# Patient Record
Sex: MALE | Race: BLACK OR AFRICAN AMERICAN | URBAN - METROPOLITAN AREA
[De-identification: names, ages, dates, MRNs, and addresses within clinical notes are randomized per-mention and may not be internally consistent; named-entity substitution may affect disease eponyms.]

---

## 2017-10-13 ENCOUNTER — OFFICE VISIT (OUTPATIENT)
Dept: FAMILY MEDICINE | Facility: CLINIC | Age: 42
End: 2017-10-13
Payer: COMMERCIAL

## 2017-10-13 VITALS
TEMPERATURE: 98.1 F | DIASTOLIC BLOOD PRESSURE: 82 MMHG | BODY MASS INDEX: 26.64 KG/M2 | SYSTOLIC BLOOD PRESSURE: 131 MMHG | WEIGHT: 183 LBS | OXYGEN SATURATION: 99 % | HEART RATE: 59 BPM

## 2017-10-13 DIAGNOSIS — Z00.01 ENCOUNTER FOR ROUTINE ADULT HEALTH EXAMINATION WITH ABNORMAL FINDINGS: Primary | ICD-10-CM

## 2017-10-13 DIAGNOSIS — Z23 NEED FOR PROPHYLACTIC VACCINATION AND INOCULATION AGAINST INFLUENZA: ICD-10-CM

## 2017-10-13 DIAGNOSIS — Z13.6 CARDIOVASCULAR SCREENING; LDL GOAL LESS THAN 160: ICD-10-CM

## 2017-10-13 LAB
CHOLEST SERPL-MCNC: 242 MG/DL
GLUCOSE BLD-MCNC: 105 MG/DL (ref 70–99)
HDLC SERPL-MCNC: 48 MG/DL
LDLC SERPL CALC-MCNC: 177 MG/DL
NONHDLC SERPL-MCNC: 194 MG/DL
TRIGL SERPL-MCNC: 87 MG/DL

## 2017-10-13 PROCEDURE — 82947 ASSAY GLUCOSE BLOOD QUANT: CPT | Performed by: PHYSICIAN ASSISTANT

## 2017-10-13 PROCEDURE — 36415 COLL VENOUS BLD VENIPUNCTURE: CPT | Performed by: PHYSICIAN ASSISTANT

## 2017-10-13 PROCEDURE — 90686 IIV4 VACC NO PRSV 0.5 ML IM: CPT | Performed by: PHYSICIAN ASSISTANT

## 2017-10-13 PROCEDURE — 90471 IMMUNIZATION ADMIN: CPT | Performed by: PHYSICIAN ASSISTANT

## 2017-10-13 PROCEDURE — 80061 LIPID PANEL: CPT | Performed by: PHYSICIAN ASSISTANT

## 2017-10-13 PROCEDURE — 99396 PREV VISIT EST AGE 40-64: CPT | Mod: 25 | Performed by: PHYSICIAN ASSISTANT

## 2017-10-13 ASSESSMENT — PAIN SCALES - GENERAL: PAINLEVEL: NO PAIN (0)

## 2017-10-13 NOTE — MR AVS SNAPSHOT
After Visit Summary   10/13/2017    Riki Thayer    MRN: [id number]           Patient Information     Date Of Birth          1975        Visit Information        Provider Department      10/13/2017 8:20 AM Khurram Sunshine PA-C [facility]        Today's Diagnoses     Encounter for routine adult health examination with abnormal findings    -  1    CARDIOVASCULAR SCREENING; LDL GOAL LESS THAN 160        Need for prophylactic vaccination and inoculation against influenza          Care Instructions      Preventive Health Recommendations  Male Ages 40 to 49    Yearly exam:             See your health care provider every year in order to  o   Review health changes.   o   Discuss preventive care.    o   Review your medicines if your doctor has prescribed any.    You should be tested each year for STDs (sexually transmitted diseases) if you re at risk.     Have a cholesterol test every 5 years.     Have a colonoscopy (test for colon cancer) if someone in your family has had colon cancer or polyps before age 50.     After age 45, have a diabetes test (fasting glucose). If you are at risk for diabetes, you should have this test every 3 years.      Talk with your health care provider about whether or not a prostate cancer screening test (PSA) is right for you.    Shots: Get a flu shot each year. Get a tetanus shot every 10 years.     Nutrition:    Eat at least 5 servings of fruits and vegetables daily.     Eat whole-grain bread, whole-wheat pasta and brown rice instead of white grains and rice.     Talk to your provider about Calcium and Vitamin D.     Lifestyle    Exercise for at least 150 minutes a week (30 minutes a day, 5 days a week). This will help you control your weight and prevent disease.     Limit alcohol to one drink per day.     No smoking.     Wear sunscreen to prevent skin cancer.     See your dentist every six months for an exam and cleaning.               Follow-ups after your visit        Who to contact     Normal or non-critical lab and imaging results will be communicated to you by [employer], letter or phone within 4 business days after the clinic has received the results. If you do not hear from us within 7 days, please contact the clinic through [employer] or phone. If you have a critical or abnormal lab result, we will notify you by phone as soon as possible.  Submit refill requests through [employer] or call your pharmacy and they will forward the refill request to us. Please allow 3 business days for your refill to be completed.          If you need to speak with a  for additional information , please call: [phone]             Additional Information About Your Visit        [employer] Information     [employer] gives you secure access to your electronic health record. If you see a primary care provider, you can also send messages to your care team and make appointments. If you have questions, please call your primary care clinic.  If you do not have a primary care provider, please call [phone] and they will assist you.        Care EveryWhere ID     This is your Care EveryWhere ID. This could be used by other organizations to access your [facility] medical records  [id number]        Your Vitals Were     Pulse Temperature Pulse Oximetry BMI (Body Mass Index)          59 98.1  F (36.7  C) (Oral) 99% 26.64 kg/m2         Blood Pressure from Last 3 Encounters:   10/13/17 131/82   10/10/16 115/75   09/24/15 122/72    Weight from Last 3 Encounters:   10/13/17 183 lb (83 kg)   10/10/16 183 lb (83 kg)   09/24/15 179 lb 8 oz (81.4 kg)              We Performed the Following     FLU VAC, SPLIT VIRUS IM > 3 YO (QUADRIVALENT) [83490]     Glucose whole blood     Lipid panel reflex to direct LDL     Vaccine Administration, Initial [51618]        Primary Care Provider Office Phone # Fax #    Khurram Sunshine PA-C [phone]       [address]  [place] [address]        Equal Access to Services     [facility] : Hadii marquez cano paulana Harper, wamalloryda luqadaha, qaybta kaalmatenisha johnsonvivianamartha sotelo. So [facility] [phone].    ATENCIÓN: Si habla español, tiene a hunter disposición servicios gratuitos de asistencia lingüística. Llame al [phone].    We comply with applicable federal civil rights laws and [place] laws. We do not discriminate on the basis of race, color, national origin, age, disability, sex, sexual orientation, or gender identity.            Thank you!     Thank you for choosing [facility]  for your care. Our goal is always to provide you with excellent care. Hearing back from our patients is one way we can continue to improve our services. Please take a few minutes to complete the written survey that you may receive in the mail after your visit with us. Thank you!             Your Updated Medication List - Protect others around you: Learn how to safely use, store and throw away your medicines at www.disposemymeds.org.          This list is accurate as of: 10/13/17  9:13 AM.  Always use your most recent med list.                   Brand Name Dispense Instructions for use Diagnosis    clotrimazole-betamethasone cream    LOTRISONE    45 g    Apply topically 2 times daily    Tinea cruris

## 2017-10-13 NOTE — PROGRESS NOTES
"  SUBJECTIVE:   CC: Riki Thayer is an 42 year old male who presents for preventative health visit.     Healthy Habits:    Do you get at least three servings of calcium containing foods daily (dairy, green leafy vegetables, etc.)? {YES/NO, DAIRY INTAKE:727550::\"yes\"}    Amount of exercise or daily activities, outside of work: {AMOUNT EXERCISE:903484}    Problems taking medications regularly {Yes /No default:968283::\"No\"}    Medication side effects: {Yes /No default.:824124::\"No\"}    Have you had an eye exam in the past two years? {YESNOBLANK:423123}    Do you see a dentist twice per year? {YESNOBLANK:690875}    Do you have sleep apnea, excessive snoring or daytime drowsiness?{YESNOBLANK:808582}    {Outside tests to abstract? :033609}    {additional problems to add (Optional):423149}    Today's PHQ-2 Score:   PHQ-2 ( 1999 Pfizer) 10/12/2017 10/10/2016   Q1: Little interest or pleasure in doing things 0 0   Q2: Feeling down, depressed or hopeless 0 0   PHQ-2 Score 0 0   Q1: Little interest or pleasure in doing things Not at all -   Q2: Feeling down, depressed or hopeless Not at all -   PHQ-2 Score 0 -     {PHQ-2 LOOK IN ASSESSMENTS :670701}  Abuse: Current or Past(Physical, Sexual or Emotional)- {YES/NO/NA:384367}  Do you feel safe in your environment - {YES/NO/NA:747164}    Social History   Substance Use Topics     Smoking status: Never Smoker     Smokeless tobacco: Not on file     Alcohol use Yes      Comment: one glass of wine every 2 weeks     {ETOH AUDIT:831066}    Last PSA: No results found for: PSA    Reviewed orders with patient. Reviewed health maintenance and updated orders accordingly - {Yes/No:161142::\"Yes\"}  {Chronicprobdata (Optional):140583}    Reviewed and updated as needed this visit by clinical staff         Reviewed and updated as needed this visit by Provider        {HISTORY OPTIONS (Optional):552208}    ROS:  {MALE ROS-adult preventive care package:967186::\"C: NEGATIVE for fever, " "chills, change in weight\",\"I: NEGATIVE for worrisome rashes, moles or lesions\",\"E: NEGATIVE for vision changes or irritation\",\"ENT: NEGATIVE for ear, mouth and throat problems\",\"R: NEGATIVE for significant cough or SOB\",\"CV: NEGATIVE for chest pain, palpitations or peripheral edema\",\"GI: NEGATIVE for nausea, abdominal pain, heartburn, or change in bowel habits\",\" male: negative for dysuria, hematuria, decreased urinary stream, erectile dysfunction, urethral discharge\",\"M: NEGATIVE for significant arthralgias or myalgia\",\"N: NEGATIVE for weakness, dizziness or paresthesias\",\"P: NEGATIVE for changes in mood or affect\"}    OBJECTIVE:   There were no vitals taken for this visit.  EXAM:  {Exam Choices:930764}    ASSESSMENT/PLAN:   {Diag Picklist:928579}    COUNSELING:  {MALE COUNSELING MESSAGES:967680::\"Reviewed preventive health counseling, as reflected in patient instructions\"}    {BP Counseling- Complete if BP >= 120/80  (Optional):423210}     reports that he has never smoked. He does not have any smokeless tobacco history on file.  {Tobacco Cessation -- Complete if patient is a smoker (Optional):943751}  Estimated body mass index is 26.64 kg/(m^2) as calculated from the following:    Height as of 10/10/16: 5' 9.5\" (1.765 m).    Weight as of 10/10/16: 183 lb (83 kg).   {Weight Management Plan (ACO) Complete if BMI is abnormal-  Ages 18-64  BMI >24.9.  Age 65+ with BMI <23 or >30 (Optional):028388}    Counseling Resources:  ATP IV Guidelines  Pooled Cohorts Equation Calculator  FRAX Risk Assessment  ICSI Preventive Guidelines  Dietary Guidelines for Americans, 2010  USDA's MyPlate  ASA Prophylaxis  Lung CA Screening    Khurram Sunshine PA-C  [facility]  "

## 2017-10-13 NOTE — PROGRESS NOTES
SUBJECTIVE:   CC: Riki Thayer is an 42 year old male who presents for preventative health visit.     Physical   Annual:     Getting at least 3 servings of Calcium per day::  Yes    Bi-annual eye exam::  Yes    Dental care twice a year::  Yes    Sleep apnea or symptoms of sleep apnea::  None    Diet::  Regular (no restrictions)    Frequency of exercise::  1 day/week    Duration of exercise::  15-30 minutes    Taking medications regularly::  Yes    Medication side effects::  Not applicable    Additional concerns today::  No            Today's PHQ-2 Score:   PHQ-2 ( 1999 Pfizer) 10/12/2017   Q1: Little interest or pleasure in doing things 0   Q2: Feeling down, depressed or hopeless 0   PHQ-2 Score 0   Q1: Little interest or pleasure in doing things Not at all   Q2: Feeling down, depressed or hopeless Not at all   PHQ-2 Score 0       Abuse: Current or Past(Physical, Sexual or Emotional)- No  Do you feel safe in your environment - Yes    Social History   Substance Use Topics     Smoking status: Never Smoker     Smokeless tobacco: Never Used     Alcohol use Yes      Comment: one glass of wine every 2 weeks     The patient does not drink >3 drinks per day nor >7 drinks per week.    Last PSA: No results found for: PSA    Reviewed orders with patient. Reviewed health maintenance and updated orders accordingly - Yes  BP Readings from Last 3 Encounters:   10/13/17 131/82   10/10/16 115/75   09/24/15 122/72    Wt Readings from Last 3 Encounters:   10/13/17 183 lb (83 kg)   10/10/16 183 lb (83 kg)   09/24/15 179 lb 8 oz (81.4 kg)                  Patient Active Problem List   Diagnosis     Positive PPD     Insomnia     CARDIOVASCULAR SCREENING; LDL GOAL LESS THAN 160     Gastroesophageal reflux disease without esophagitis     Past Surgical History:   Procedure Laterality Date     NO HISTORY OF SURGERY         Social History   Substance Use Topics     Smoking status: Never Smoker     Smokeless tobacco: Never  Used     Alcohol use Yes      Comment: one glass of wine every 2 weeks     Family History   Problem Relation Age of Onset     DIABETES Paternal Half-Brother      Hypertension Paternal Half-Brother      Prostate Cancer Paternal Half-Brother      DIABETES Paternal Half-Sister      C.A.D. No family hx of      DIABETES No family hx of      Hypertension No family hx of      CEREBROVASCULAR DISEASE No family hx of      Cancer - colorectal No family hx of      Prostate Cancer No family hx of          Current Outpatient Prescriptions   Medication Sig Dispense Refill     clotrimazole-betamethasone (LOTRISONE) cream Apply topically 2 times daily 45 g 1     No Known Allergies        Reviewed and updated as needed this visit by clinical staff  Tobacco  Allergies  Meds  Problems  Med Hx  Surg Hx  Fam Hx  Soc Hx          Reviewed and updated as needed this visit by Provider  Tobacco  Allergies  Meds  Problems  Med Hx  Surg Hx  Fam Hx  Soc Hx         Past Medical History:   Diagnosis Date     NO ACTIVE PROBLEMS       Past Surgical History:   Procedure Laterality Date     NO HISTORY OF SURGERY         ROS:  C: NEGATIVE for fever, chills, change in weight  I: NEGATIVE for worrisome rashes, moles or lesions  E: NEGATIVE for vision changes or irritation  ENT: NEGATIVE for ear, mouth and throat problems  R: NEGATIVE for significant cough or SOB  CV: NEGATIVE for chest pain, palpitations or peripheral edema  GI: NEGATIVE for nausea, abdominal pain, heartburn, or change in bowel habits   male: negative for dysuria, hematuria, decreased urinary stream, erectile dysfunction, urethral discharge  M: NEGATIVE for significant arthralgias or myalgia  N: NEGATIVE for weakness, dizziness or paresthesias  P: NEGATIVE for changes in mood or affect    OBJECTIVE:   /82 (BP Location: Left arm, Patient Position: Chair, Cuff Size: Adult Regular)  Pulse 59  Temp 98.1  F (36.7  C) (Oral)  Wt 183 lb (83 kg)  SpO2 99%  BMI 26.64  "kg/m2    EXAM:  GENERAL: healthy, alert and no distress  EYES: Eyes grossly normal to inspection, PERRL and conjunctivae and sclerae normal  HENT: ear canals and TM's normal, nose and mouth without ulcers or lesions  NECK: no adenopathy, no asymmetry, masses, or scars and thyroid normal to palpation  RESP: lungs clear to auscultation - no rales, rhonchi or wheezes  CV: regular rate and rhythm, normal S1 S2, no S3 or S4, no murmur, click or rub, no peripheral edema and peripheral pulses strong  ABDOMEN: soft, nontender, no hepatosplenomegaly, no masses and bowel sounds normal  MS: no gross musculoskeletal defects noted, no edema  SKIN: no suspicious lesions or rashes  NEURO: Normal strength and tone, mentation intact and speech normal  PSYCH: mentation appears normal, affect normal/bright    ASSESSMENT/PLAN:       ICD-10-CM    1. Encounter for routine adult health examination with abnormal findings Z00.01    2. CARDIOVASCULAR SCREENING; LDL GOAL LESS THAN 160 Z13.6 Lipid panel reflex to direct LDL     Glucose whole blood   3. Need for prophylactic vaccination and inoculation against influenza Z23 FLU VAC, SPLIT VIRUS IM > 3 YO (QUADRIVALENT) [69041]     Vaccine Administration, Initial [02347]       COUNSELING:   Reviewed preventive health counseling, as reflected in patient instructions       Regular exercise       Healthy diet/nutrition         reports that he has never smoked. He has never used smokeless tobacco.      Estimated body mass index is 26.64 kg/(m^2) as calculated from the following:    Height as of 10/10/16: 5' 9.5\" (1.765 m).    Weight as of this encounter: 183 lb (83 kg).         Counseling Resources:  ATP IV Guidelines  Pooled Cohorts Equation Calculator  FRAX Risk Assessment  ICSI Preventive Guidelines  Dietary Guidelines for Americans, 2010  USDA's MyPlate  ASA Prophylaxis  Lung CA Screening    Khurram Sunshine PA-C  [facility] BLAINEInjectable Influenza Immunization Documentation    1.  Is " the person to be vaccinated sick today?   No    2. Does the person to be vaccinated have an allergy to a component   of the vaccine?   No    3. Has the person to be vaccinated ever had a serious reaction   to influenza vaccine in the past?   No    4. Has the person to be vaccinated ever had Guillain-Barré syndrome?   No    Form completed by Jessie Donaldson MA

## 2018-01-10 ENCOUNTER — OFFICE VISIT (OUTPATIENT)
Dept: OPTOMETRY | Facility: CLINIC | Age: 43
End: 2018-01-10
Payer: COMMERCIAL

## 2018-01-10 DIAGNOSIS — H52.4 HYPEROPIA OF BOTH EYES WITH ASTIGMATISM AND PRESBYOPIA: Primary | ICD-10-CM

## 2018-01-10 DIAGNOSIS — H52.203 HYPEROPIA OF BOTH EYES WITH ASTIGMATISM AND PRESBYOPIA: Primary | ICD-10-CM

## 2018-01-10 DIAGNOSIS — H52.03 HYPEROPIA OF BOTH EYES WITH ASTIGMATISM AND PRESBYOPIA: Primary | ICD-10-CM

## 2018-01-10 PROCEDURE — 92015 DETERMINE REFRACTIVE STATE: CPT | Performed by: OPTOMETRIST

## 2018-01-10 PROCEDURE — 92004 COMPRE OPH EXAM NEW PT 1/>: CPT | Performed by: OPTOMETRIST

## 2018-01-10 ASSESSMENT — CONF VISUAL FIELD
OS_NORMAL: 1
OD_NORMAL: 1

## 2018-01-10 ASSESSMENT — EXTERNAL EXAM - RIGHT EYE: OD_EXAM: NORMAL

## 2018-01-10 ASSESSMENT — TONOMETRY
IOP_METHOD: APPLANATION
OD_IOP_MMHG: 16
OS_IOP_MMHG: 16

## 2018-01-10 ASSESSMENT — REFRACTION_MANIFEST
OS_ADD: +1.25
OD_CYLINDER: +0.25
OS_AXIS: 025
OD_SPHERE: PLANO
OS_SPHERE: +0.25
OS_CYLINDER: +0.50
OD_ADD: +1.25
OD_AXIS: 010

## 2018-01-10 ASSESSMENT — CUP TO DISC RATIO
OD_RATIO: 0.35
OS_RATIO: 0.35

## 2018-01-10 ASSESSMENT — SLIT LAMP EXAM - LIDS
COMMENTS: NORMAL
COMMENTS: NORMAL

## 2018-01-10 ASSESSMENT — VISUAL ACUITY
METHOD: SNELLEN - LINEAR
OD_SC: 20/20 -1
OD_SC: 20/20
OS_SC: 20/20
OS_SC: 20/20

## 2018-01-10 ASSESSMENT — EXTERNAL EXAM - LEFT EYE: OS_EXAM: NORMAL

## 2018-01-10 NOTE — MR AVS SNAPSHOT
After Visit Summary   1/10/2018    Riki Thayer    MRN: [id number]           Patient Information     Date Of Birth          1975        Visit Information        Provider Department      1/10/2018 8:20 AM Samara Alvarado OD [facility]        Today's Diagnoses     Hyperopia of both eyes with astigmatism and presbyopia    -  1      Care Instructions    A final glasses prescription was given.  Allow time for adaptation to lenses.  You have the option of wearing reading glasses that would allow you to see up close (anytime you look far away, you have to take them off) or you can get a progressive lens or a lined bifocal, these would allow you to see clearly in the distance and close up.    Your ocular health was normal today, therefore no treatment other than glasses is recommended.    Return to clinic in 1 year for your next eye exam.      Samara Alvarado O.D  [address] [place]  [address]    [phone]                         Follow-ups after your visit        Follow-up notes from your care team     Return in about 1 year (around 1/10/2019) for Eye Exam.      Who to contact     If you have questions or need follow up information about today's clinic visit or your schedule please contact [facility] directly at [phone].  Normal or non-critical lab and imaging results will be communicated to you by MyChart, letter or phone within 4 business days after the clinic has received the results. If you do not hear from us within 7 days, please contact the clinic through MyChart or phone. If you have a critical or abnormal lab result, we will notify you by phone as soon as possible.  Submit refill requests through [employer] or call your pharmacy and they will forward the refill request to us. Please allow 3 business days for your refill to be completed.          Additional Information About Your Visit        [employer]  Information     [employer] gives you secure access to your electronic health record. If you see a primary care provider, you can also send messages to your care team and make appointments. If you have questions, please call your primary care clinic.  If you do not have a primary care provider, please call [phone] and they will assist you.        Care EveryWhere ID     This is your Care EveryWhere ID. This could be used by other organizations to access your [place] medical records  [id number]         Blood Pressure from Last 3 Encounters:   10/13/17 131/82   10/10/16 115/75   09/24/15 122/72    Weight from Last 3 Encounters:   10/13/17 83 kg (183 lb)   10/10/16 83 kg (183 lb)   09/24/15 81.4 kg (179 lb 8 oz)              We Performed the Following     EYE EXAM (SIMPLE-NONBILLABLE)     REFRACTION        Primary Care Provider Office Phone # Fax #    Khurram Darlin Sunshine PA-C [phone]       [address]  [place] [address]        Equal Access to Services     [facility]: Hadii aad ku hadasho Soomaali, waaxda luqadaha, qaybta kaalmada adeegyada, waxay idiin hayaan leeanne atwood . So [facility] [phone].    ATENCIÓN: Si habla español, tiene a hunter disposición servicios gratuitos de asistencia lingüística. Trentame al [phone].    We comply with applicable federal civil rights laws and [place] laws. We do not discriminate on the basis of race, color, national origin, age, disability, sex, sexual orientation, or gender identity.            Thank you!     Thank you for choosing [facility] [place]  for your care. Our goal is always to provide you with excellent care. Hearing back from our patients is one way we can continue to improve our services. Please take a few minutes to complete the written survey that you may receive in the mail after your visit with us. Thank you!             Your Updated Medication List - Protect others around you: Learn how to safely use, store and throw away  your medicines at www.disposemymeds.org.          This list is accurate as of: 1/10/18  9:12 AM.  Always use your most recent med list.                   Brand Name Dispense Instructions for use Diagnosis    clotrimazole-betamethasone cream    LOTRISONE    45 g    Apply topically 2 times daily    Tinea cruris

## 2018-01-10 NOTE — PROGRESS NOTES
Chief Complaint   Patient presents with     COMPREHENSIVE EYE EXAM      Accompanied by self  Last Eye Exam: ?  Dilated Previously: Yes    What are you currently using to see?  does not use glasses or contacts       Distance Vision Acuity: Satisfied with vision    Near Vision Acuity: Satisfied with vision while reading  unaided    Eye Comfort: good  Do you use eye drops? 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       OBJECTIVE: See Ophthalmology exam    ASSESSMENT:    ICD-10-CM    1. Hyperopia of both eyes with astigmatism and presbyopia H52.03 EYE EXAM (SIMPLE-NONBILLABLE)    H52.203 REFRACTION    H52.4       PLAN:   A final glasses prescription was given.  Allow time for adaptation to lenses.  You have the option of wearing reading glasses that would allow you to see up close (anytime you look far away, you have to take them off) or you can get a progressive lens or a lined bifocal, these would allow you to see clearly in the distance and close up.    Your ocular health was normal today, therefore no treatment other than glasses is recommended.    Return to clinic in 1 year for your next eye exam.      Samara Alvarado O.D  [facility]  [address] [place] [address]. [place]  [address]    [phone]

## 2018-01-10 NOTE — PATIENT INSTRUCTIONS
A final glasses prescription was given.  Allow time for adaptation to lenses.  You have the option of wearing reading glasses that would allow you to see up close (anytime you look far away, you have to take them off) or you can get a progressive lens or a lined bifocal, these would allow you to see clearly in the distance and close up.    Your ocular health was normal today, therefore no treatment other than glasses is recommended.    Return to clinic in 1 year for your next eye exam.      Samara Alvarado O.D  [address]. [place]  [address]    [phone]

## 2018-11-23 ENCOUNTER — OFFICE VISIT (OUTPATIENT)
Dept: FAMILY MEDICINE | Facility: CLINIC | Age: 43
End: 2018-11-23
Payer: COMMERCIAL

## 2018-11-23 VITALS
OXYGEN SATURATION: 99 % | HEIGHT: 70 IN | WEIGHT: 190 LBS | RESPIRATION RATE: 16 BRPM | BODY MASS INDEX: 27.2 KG/M2 | DIASTOLIC BLOOD PRESSURE: 80 MMHG | TEMPERATURE: 97.9 F | HEART RATE: 61 BPM | SYSTOLIC BLOOD PRESSURE: 123 MMHG

## 2018-11-23 DIAGNOSIS — Z00.01 ENCOUNTER FOR ROUTINE ADULT MEDICAL EXAM WITH ABNORMAL FINDINGS: Primary | ICD-10-CM

## 2018-11-23 DIAGNOSIS — L20.84 INTRINSIC ECZEMA: ICD-10-CM

## 2018-11-23 DIAGNOSIS — Z23 NEED FOR PROPHYLACTIC VACCINATION AND INOCULATION AGAINST INFLUENZA: ICD-10-CM

## 2018-11-23 DIAGNOSIS — L29.9 ITCHING: ICD-10-CM

## 2018-11-23 LAB
CHOLEST SERPL-MCNC: 187 MG/DL
DEPRECATED CALCIDIOL+CALCIFEROL SERPL-MC: 10 UG/L (ref 20–75)
GLUCOSE SERPL-MCNC: 104 MG/DL (ref 70–99)
HDLC SERPL-MCNC: 47 MG/DL
HIV 1+2 AB+HIV1 P24 AG SERPL QL IA: NONREACTIVE
LDLC SERPL CALC-MCNC: 118 MG/DL
NONHDLC SERPL-MCNC: 140 MG/DL
TRIGL SERPL-MCNC: 112 MG/DL

## 2018-11-23 PROCEDURE — 87389 HIV-1 AG W/HIV-1&-2 AB AG IA: CPT | Performed by: FAMILY MEDICINE

## 2018-11-23 PROCEDURE — 99396 PREV VISIT EST AGE 40-64: CPT | Mod: 25 | Performed by: FAMILY MEDICINE

## 2018-11-23 PROCEDURE — 82947 ASSAY GLUCOSE BLOOD QUANT: CPT | Performed by: FAMILY MEDICINE

## 2018-11-23 PROCEDURE — 99213 OFFICE O/P EST LOW 20 MIN: CPT | Mod: 25 | Performed by: FAMILY MEDICINE

## 2018-11-23 PROCEDURE — 36415 COLL VENOUS BLD VENIPUNCTURE: CPT | Performed by: FAMILY MEDICINE

## 2018-11-23 PROCEDURE — 80061 LIPID PANEL: CPT | Performed by: FAMILY MEDICINE

## 2018-11-23 PROCEDURE — 82306 VITAMIN D 25 HYDROXY: CPT | Performed by: FAMILY MEDICINE

## 2018-11-23 PROCEDURE — 90471 IMMUNIZATION ADMIN: CPT | Performed by: FAMILY MEDICINE

## 2018-11-23 PROCEDURE — 90686 IIV4 VACC NO PRSV 0.5 ML IM: CPT | Performed by: FAMILY MEDICINE

## 2018-11-23 RX ORDER — CLOTRIMAZOLE AND BETAMETHASONE DIPROPIONATE 10; .64 MG/G; MG/G
CREAM TOPICAL 2 TIMES DAILY
Qty: 45 G | Refills: 1 | Status: CANCELLED | OUTPATIENT
Start: 2018-11-23

## 2018-11-23 RX ORDER — BENZOCAINE/MENTHOL 6 MG-10 MG
LOZENGE MUCOUS MEMBRANE
Qty: 120 G | Refills: 3 | Status: SHIPPED | OUTPATIENT
Start: 2018-11-23

## 2018-11-23 RX ORDER — TRIAMCINOLONE ACETONIDE 1 MG/G
CREAM TOPICAL 2 TIMES DAILY
Qty: 453.6 G | Refills: 11 | Status: SHIPPED | OUTPATIENT
Start: 2018-11-23

## 2018-11-23 ASSESSMENT — ENCOUNTER SYMPTOMS
HEMATURIA: 0
EYE PAIN: 0
DIZZINESS: 0
CHILLS: 0
CONSTIPATION: 0
COUGH: 0
HEMATOCHEZIA: 0
ABDOMINAL PAIN: 0
DIARRHEA: 0

## 2018-11-23 NOTE — PROGRESS NOTES
SUBJECTIVE:   CC: Riki Thayer is an 43 year old male who presents for preventative health visit.     Healthy Habits:  Answers for HPI/ROS submitted by the patient on 11/23/2018   Annual Exam:  Frequency of exercise:: 2-3 days/week  Getting at least 3 servings of Calcium per day:: NO  Diet:: Regular (no restrictions)  Taking medications regularly:: Yes  Medication side effects:: None  Bi-annual eye exam:: Yes  Dental care twice a year:: NO  Sleep apnea or symptoms of sleep apnea:: None  Negative for the following: abdominal pain, Blood in stool, Blood in urine, chest pain, chills, congestion, constipation, cough, diarrhea, dizziness, ear pain, eye pain, nervous/anxious, fever, frequency, genital sores, headaches, hearing loss, heartburn, arthralgias, joint swelling, peripheral edema, mood changes, myalgias, nausea, dysuria, palpitations, Skin sensation changes, sore throat, urgency, rash, shortness of breath, visual disturbance, weakness  Additional concerns today:: Yes  PHQ-2 Score: 0  Duration of exercise:: 30-45 minutes           Today's PHQ-2 Score:   PHQ-2 ( 1999 [employer]) 11/23/2018 10/12/2017   Q1: Little interest or pleasure in doing things 0 0   Q2: Feeling down, depressed or hopeless 0 0   PHQ-2 Score 0 0   Q1: Little interest or pleasure in doing things Not at all Not at all   Q2: Feeling down, depressed or hopeless Not at all Not at all   PHQ-2 Score 0 0       Abuse: Current or Past(Physical, Sexual or Emotional)- No  Do you feel safe in your environment - Yes    Social History   Substance Use Topics     Smoking status: Never Smoker     Smokeless tobacco: Never Used     Alcohol use Yes      Comment: one glass of wine every 2 weeks      If you drink alcohol do you typically have >3 drinks per day or >7 drinks per week? No                      Last PSA: No results found for: PSA    Reviewed orders with patient. Reviewed health maintenance and updated orders accordingly - Yes  Labs  reviewed in EPIC  BP Readings from Last 3 Encounters:   11/23/18 123/80   10/13/17 131/82   10/10/16 115/75    Wt Readings from Last 3 Encounters:   11/23/18 190 lb (86.2 kg)   10/13/17 183 lb (83 kg)   10/10/16 183 lb (83 kg)                  Patient Active Problem List   Diagnosis     Positive PPD     Insomnia     CARDIOVASCULAR SCREENING; LDL GOAL LESS THAN 160     Gastroesophageal reflux disease without esophagitis     Past Surgical History:   Procedure Laterality Date     NO HISTORY OF SURGERY         Social History   Substance Use Topics     Smoking status: Never Smoker     Smokeless tobacco: Never Used     Alcohol use Yes      Comment: one glass of wine every 2 weeks     Family History   Problem Relation Age of Onset     Glaucoma Father      Diabetes Paternal Half-Brother      Hypertension Paternal Half-Brother      Prostate Cancer Paternal Half-Brother      Diabetes Paternal Half-Sister      C.A.D. No family hx of      Cerebrovascular Disease No family hx of      Cancer - colorectal No family hx of          Current Outpatient Prescriptions   Medication Sig Dispense Refill     clotrimazole-betamethasone (LOTRISONE) cream Apply topically 2 times daily 45 g 1     hydrocortisone (CORTAID) 1 % cream Apply sparingly to affected area three times daily as needed. 120 g 3     triamcinolone (KENALOG) 0.1 % cream Apply topically 2 times daily 453.6 g 11     No Known Allergies    Reviewed and updated as needed this visit by clinical staff  Tobacco  Allergies  Meds  Med Hx  Surg Hx  Fam Hx  Soc Hx        Reviewed and updated as needed this visit by Provider        Past Medical History:   Diagnosis Date     NO ACTIVE PROBLEMS       Past Surgical History:   Procedure Laterality Date     NO HISTORY OF SURGERY     Rash on the Rt leg, and itching in the groin area:      Duration: chronic    Description  Location: Rt leg  Itching: moderate, also itching in the groin area with no rash.    Intensity:   "moderate    Accompanying signs and symptoms: None    History (similar episodes/previous evaluation): yes, treated with jock itch cream in the groin area, with no help.    Precipitating or alleviating factors:  New exposures:  None  Recent travel: no      Therapies tried and outcome: antifungal cream in the groin area.         ROS:  CONSTITUTIONAL: NEGATIVE for fever, chills, change in weight  INTEGUMENTARY/SKIN: as above.  EYES: NEGATIVE for vision changes or irritation  ENT: NEGATIVE for ear, mouth and throat problems  RESP: NEGATIVE for significant cough or SOB  CV: NEGATIVE for chest pain, palpitations or peripheral edema  GI: NEGATIVE for nausea, abdominal pain, heartburn, or change in bowel habits   male: negative for dysuria, hematuria, decreased urinary stream, erectile dysfunction, urethral discharge  MUSCULOSKELETAL: NEGATIVE for significant arthralgias or myalgia  NEURO: NEGATIVE for weakness, dizziness or paresthesias  PSYCHIATRIC: NEGATIVE for changes in mood or affect    OBJECTIVE:   /80 (BP Location: Right arm, Patient Position: Chair, Cuff Size: Adult Large)  Pulse 61  Temp 97.9  F (36.6  C) (Oral)  Resp 16  Ht 5' 10\" (1.778 m)  Wt 190 lb (86.2 kg)  SpO2 99%  BMI 27.26 kg/m2  EXAM:  GENERAL: healthy, alert and no distress  EYES: Eyes grossly normal to inspection, PERRL and conjunctivae and sclerae normal  HENT: ear canals and TM's normal, nose and mouth without ulcers or lesions  NECK: no adenopathy, no asymmetry, masses, or scars and thyroid normal to palpation  RESP: lungs clear to auscultation - no rales, rhonchi or wheezes  CV: regular rate and rhythm, normal S1 S2, no S3 or S4, no murmur, click or rub, no peripheral edema and peripheral pulses strong  ABDOMEN: soft, nontender, no hepatosplenomegaly, no masses and bowel sounds normal  MS: no gross musculoskeletal defects noted, no edema  SKIN: hyperpigmented dry patch on the Rt side of the calf with excoriation.  NEURO: Normal " "strength and tone, mentation intact and speech normal  PSYCH: mentation appears normal, affect normal/bright        ASSESSMENT/PLAN:   1. Encounter for routine adult medical exam with abnormal findings  Doing very well, encourage exercise.  - Lipid panel reflex to direct LDL Fasting  - Glucose  - Vitamin D Deficiency  - HIV Antigen Antibody Combo    2. Intrinsic eczema  On the Rt leg, will start on   - triamcinolone (KENALOG) 0.1 % cream; Apply topically 2 times daily  Dispense: 453.6 g; Refill: 11    3. Itching  In the groin area, no rash or skin changes seen, will start on   - hydrocortisone (CORTAID) 1 % cream; Apply sparingly to affected area three times daily as needed.  Dispense: 120 g; Refill: 3    4. Need for prophylactic vaccination and inoculation against influenza    - FLU VACCINE, SPLIT VIRUS, IM (QUADRIVALENT) [89418]- >3 YRS  - Vaccine Administration, Initial [69578]    COUNSELING:  Reviewed preventive health counseling, as reflected in patient instructions       Regular exercise    BP Readings from Last 1 Encounters:   11/23/18 123/80     Estimated body mass index is 27.26 kg/(m^2) as calculated from the following:    Height as of this encounter: 5' 10\" (1.778 m).    Weight as of this encounter: 190 lb (86.2 kg).      Weight management plan: Discussed healthy diet and exercise guidelines and patient will follow up in 12 months in clinic to re-evaluate.     reports that he has never smoked. He has never used smokeless tobacco.      Counseling Resources:  ATP IV Guidelines  Pooled Cohorts Equation Calculator  FRAX Risk Assessment  ICSI Preventive Guidelines  Dietary Guidelines for Americans, 2010  USDA's MyPlate  ASA Prophylaxis  Lung CA Screening    Dev Hook MD  [facility]    Injectable Influenza Immunization Documentation    1.  Is the person to be vaccinated sick today?   No    2. Does the person to be vaccinated have an allergy to a component   of the vaccine?   No  Egg Allergy " Algorithm Link    3. Has the person to be vaccinated ever had a serious reaction   to influenza vaccine in the past?   No    4. Has the person to be vaccinated ever had Guillain-Barré syndrome?   No    Form completed by Amalia Marlow CMA

## 2018-11-23 NOTE — MR AVS SNAPSHOT
After Visit Summary   11/23/2018    Riki Thayer    MRN: [id number]           Patient Information     Date Of Birth          1975        Visit Information        Provider Department      11/23/2018 9:15 AM Dev Hook MD [facility]        Today's Diagnoses     Encounter for routine adult medical exam with abnormal findings    -  1    Intrinsic eczema        Itching          Care Instructions      Preventive Health Recommendations  Male Ages 40 to 49    Yearly exam:             See your health care provider every year in order to  o   Review health changes.   o   Discuss preventive care.    o   Review your medicines if your doctor has prescribed any.    You should be tested each year for STDs (sexually transmitted diseases) if you re at risk.     Have a cholesterol test every 5 years.     Have a colonoscopy (test for colon cancer) if someone in your family has had colon cancer or polyps before age 50.     After age 45, have a diabetes test (fasting glucose). If you are at risk for diabetes, you should have this test every 3 years.      Talk with your health care provider about whether or not a prostate cancer screening test (PSA) is right for you.    Shots: Get a flu shot each year. Get a tetanus shot every 10 years.     Nutrition:    Eat at least 5 servings of fruits and vegetables daily.     Eat whole-grain bread, whole-wheat pasta and brown rice instead of white grains and rice.     Get adequate Calcium and Vitamin D.     Lifestyle    Exercise for at least 150 minutes a week (30 minutes a day, 5 days a week). This will help you control your weight and prevent disease.     Limit alcohol to one drink per day.     No smoking.     Wear sunscreen to prevent skin cancer.     See your dentist every six months for an exam and cleaning.              Follow-ups after your visit        Follow-up notes from your care team     Return in about 1 year (around 11/23/2019).     "  Who to contact     If you have questions or need follow up information about today's clinic visit or your schedule please contact [facility] directly at [phone].  Normal or non-critical lab and imaging results will be communicated to you by DDNhart, letter or phone within 4 business days after the clinic has received the results. If you do not hear from us within 7 days, please contact the clinic through DDNhart or phone. If you have a critical or abnormal lab result, we will notify you by phone as soon as possible.  Submit refill requests through [facility] or call your pharmacy and they will forward the refill request to us. Please allow 3 business days for your refill to be completed.          Additional Information About Your Visit        [facility] Information     [facility] gives you secure access to your electronic health record. If you see a primary care provider, you can also send messages to your care team and make appointments. If you have questions, please call your primary care clinic.  If you do not have a primary care provider, please call [phone] and they will assist you.        Care EveryWhere ID     This is your Care EveryWhere ID. This could be used by other organizations to access your [facility] medical records  [id number]        Your Vitals Were     Pulse Temperature Respirations Height Pulse Oximetry BMI (Body Mass Index)    61 97.9  F (36.6  C) (Oral) 16 5' 10\" (1.778 m) 99% 27.26 kg/m2       Blood Pressure from Last 3 Encounters:   11/23/18 123/80   10/13/17 131/82   10/10/16 115/75    Weight from Last 3 Encounters:   11/23/18 190 lb (86.2 kg)   10/13/17 183 lb (83 kg)   10/10/16 183 lb (83 kg)              We Performed the Following     Glucose     HIV Antigen Antibody Combo     Lipid panel reflex to direct LDL Fasting     Vitamin D Deficiency          Today's Medication Changes          These changes are accurate as of 11/23/18  9:56 AM.  If you have any questions, " ask your nurse or doctor.               Start taking these medicines.        Dose/Directions    hydrocortisone 1 % cream   Commonly known as:  CORTAID   Used for:  Itching   Started by:  Dev Hook MD        Apply sparingly to affected area three times daily as needed.   Quantity:  120 g   Refills:  3       triamcinolone 0.1 % cream   Commonly known as:  KENALOG   Used for:  Intrinsic eczema   Started by:  Dev Hook MD        Apply topically 2 times daily   Quantity:  453.6 g   Refills:  11            Where to get your medicines      These [place] [facility] [address]  [address] [facility] [address]     Phone:  [phone]     hydrocortisone 1 % cream    triamcinolone 0.1 % cream                Primary Care Provider Office Phone # Fax #    Khurram Sunshine PA-C [phone] [address]  [place] [address]        Equal Access to Services     [facility]: Hadii aad ku hadasho Soomaali, waaxda luqadaha, qaybta kaalmada adeegyada, waxay idiin hayaan mekaeg kharash rai . So [facility] [phone].    ATENCIÓN: Si madelaine hodges, tiene a hunter disposición servicios gratuitos de asistencia lingüística. Roberth al [phone].    We comply with applicable federal civil rights laws and [place] laws. We do not discriminate on the basis of race, color, national origin, age, disability, sex, sexual orientation, or gender identity.            Thank you!     Thank you for choosing [facility]  for your care. Our goal is always to provide you with excellent care. Hearing back from our patients is one way we can continue to improve our services. Please take a few minutes to complete the written survey that you may receive in the mail after your visit with us. Thank you!             Your Updated Medication List - Protect others around you: Learn how to safely use, store and throw away your medicines at  www.disposemymeds.org.          This list is accurate as of 11/23/18  9:56 AM.  Always use your most recent med list.                   Brand Name Dispense Instructions for use Diagnosis    clotrimazole-betamethasone cream    LOTRISONE    45 g    Apply topically 2 times daily    Tinea cruris       hydrocortisone 1 % cream    CORTAID    120 g    Apply sparingly to affected area three times daily as needed.    Itching       triamcinolone 0.1 % cream    KENALOG    453.6 g    Apply topically 2 times daily    Intrinsic eczema

## 2018-11-24 DIAGNOSIS — R79.89 LOW SERUM VITAMIN D: Primary | ICD-10-CM

## 2018-11-24 NOTE — TELEPHONE ENCOUNTER
Please call Susana, his cholesterol and blood sugar are all normal.  But vit D is low, so I recommend VIt D supplement medication Once weekly for 3 months.   Given to the pharmacy.  Dev Hook MD  [facility]  [phone]  r

## 2018-11-26 NOTE — TELEPHONE ENCOUNTER
Patient informed. States he does not currently live in [place]. Patient unsure of what pharmacy is nearby. Patient will call back with a pharmacy to resend Vit D rx.

## 2018-11-26 NOTE — TELEPHONE ENCOUNTER
PATIENT RETURNED CALL.  WANTS IT TO:  PHONE: [phone]  [facility] IN [place], [place]  PHARMACY INFO.

## 2018-11-26 NOTE — TELEPHONE ENCOUNTER
-patient does not live in [place].   Can you please send rx to [facility] pharmacy-order/pharmacy pended.

## 2019-11-08 ENCOUNTER — HEALTH MAINTENANCE LETTER (OUTPATIENT)
Age: 44
End: 2019-11-08

## 2020-02-23 ENCOUNTER — HEALTH MAINTENANCE LETTER (OUTPATIENT)
Age: 45
End: 2020-02-23

## 2020-12-06 ENCOUNTER — HEALTH MAINTENANCE LETTER (OUTPATIENT)
Age: 45
End: 2020-12-06

## 2021-04-11 ENCOUNTER — HEALTH MAINTENANCE LETTER (OUTPATIENT)
Age: 46
End: 2021-04-11

## 2021-09-25 ENCOUNTER — HEALTH MAINTENANCE LETTER (OUTPATIENT)
Age: 46
End: 2021-09-25

## 2022-05-07 ENCOUNTER — HEALTH MAINTENANCE LETTER (OUTPATIENT)
Age: 47
End: 2022-05-07

## 2023-04-22 ENCOUNTER — HEALTH MAINTENANCE LETTER (OUTPATIENT)
Age: 48
End: 2023-04-22

## 2023-06-02 ENCOUNTER — HEALTH MAINTENANCE LETTER (OUTPATIENT)
Age: 48
End: 2023-06-02